# Patient Record
Sex: FEMALE | Race: WHITE | NOT HISPANIC OR LATINO
[De-identification: names, ages, dates, MRNs, and addresses within clinical notes are randomized per-mention and may not be internally consistent; named-entity substitution may affect disease eponyms.]

---

## 2017-01-05 ENCOUNTER — TRANSCRIPTION ENCOUNTER (OUTPATIENT)
Age: 42
End: 2017-01-05

## 2017-08-14 ENCOUNTER — RESULT REVIEW (OUTPATIENT)
Age: 42
End: 2017-08-14

## 2018-12-28 ENCOUNTER — RESULT REVIEW (OUTPATIENT)
Age: 43
End: 2018-12-28

## 2021-05-10 ENCOUNTER — RESULT REVIEW (OUTPATIENT)
Age: 46
End: 2021-05-10

## 2021-08-17 ENCOUNTER — TRANSCRIPTION ENCOUNTER (OUTPATIENT)
Age: 46
End: 2021-08-17

## 2024-06-06 ENCOUNTER — APPOINTMENT (OUTPATIENT)
Dept: UROGYNECOLOGY | Facility: CLINIC | Age: 49
End: 2024-06-06
Payer: COMMERCIAL

## 2024-06-06 VITALS
DIASTOLIC BLOOD PRESSURE: 76 MMHG | WEIGHT: 145 LBS | HEART RATE: 82 BPM | SYSTOLIC BLOOD PRESSURE: 116 MMHG | BODY MASS INDEX: 23.3 KG/M2 | HEIGHT: 66 IN

## 2024-06-06 DIAGNOSIS — N81.89 OTHER FEMALE GENITAL PROLAPSE: ICD-10-CM

## 2024-06-06 DIAGNOSIS — R33.9 RETENTION OF URINE, UNSPECIFIED: ICD-10-CM

## 2024-06-06 DIAGNOSIS — N81.6 RECTOCELE: ICD-10-CM

## 2024-06-06 DIAGNOSIS — N81.11 CYSTOCELE, MIDLINE: ICD-10-CM

## 2024-06-06 LAB
BILIRUB UR QL STRIP: NORMAL
CLARITY UR: CLEAR
COLLECTION METHOD: NORMAL
GLUCOSE UR-MCNC: NORMAL
HCG UR QL: 0.2 EU/DL
HGB UR QL STRIP.AUTO: NORMAL
KETONES UR-MCNC: NORMAL
LEUKOCYTE ESTERASE UR QL STRIP: NORMAL
NITRITE UR QL STRIP: NORMAL
PH UR STRIP: 6
PROT UR STRIP-MCNC: NORMAL
SP GR UR STRIP: 1.01

## 2024-06-06 PROCEDURE — 99204 OFFICE O/P NEW MOD 45 MIN: CPT | Mod: 25

## 2024-06-06 PROCEDURE — 51701 INSERT BLADDER CATHETER: CPT

## 2024-06-06 PROCEDURE — 81003 URINALYSIS AUTO W/O SCOPE: CPT | Mod: QW

## 2024-06-06 PROCEDURE — 99459 PELVIC EXAMINATION: CPT

## 2024-06-06 NOTE — HISTORY OF PRESENT ILLNESS
[FreeTextEntry1] : Stacia Thurston is a 49-year-old P2 presenting for evaluation of vaginal bulge. Feels something when she touches/wipes in the area. Denies any sensation at other times. No pain or discomfort. Reports symptoms started 2 months ago when she had an episode of severe constipation and was straining. She denies any chronic/baseline constipation issues. She denies leakage with activity or exertion. Reports urinary urgency but no leakage with urge.  She is sexually active and is bothered by awareness of the bulge but denies any pain with intercourse.   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

## 2024-06-06 NOTE — PROCEDURE
[FreeTextEntry1] : A sterile straight catheterization was performed to rule out a urinary tract infection and measure postvoid residual volume, which was 275 ml.

## 2024-06-06 NOTE — DISCUSSION/SUMMARY
[FreeTextEntry1] : #Prolapse:  - I counseled Stacia on the risk factors and etiologies of pelvic organ prolapse. - We reviewed management options, which included continued observation, Kegels and/or pelvic floor physical therapy, pessary, and surgery.  - She would like to start with pelvic floor therapy. Referral placed to [facility] and patient also provided with a list of external locations if needed based on availability.   #Incomplete bladder emptying -  ml today; advised patient of need to repeat to confirm retention.  - Urine dipstick negative, no concern for infection. - If persistently elevated would recommend urodynamic testing.  - RTO in 1-2 weeks for repeat PVR.

## 2024-06-06 NOTE — PHYSICAL EXAM
[Chaperone Present] : A chaperone was present in the examining room during all aspects of the physical examination [[id number]] : A chaperone was present during the pelvic exam. [No Lesions] : no lesions were seen on the external genitalia [Labia Majora] : were normal [Labia Minora] : were normal [Normal Appearance] : general appearance was normal [1] : 1 [Aa ____] : Aa [unfilled] [Ba ____] : Ba [unfilled] [C ____] : C [unfilled] [GH ____] : GH [unfilled] [PB ____] : PB [unfilled] [TVL ____] : TVL  [unfilled] [Ap ____] : Ap [unfilled] [Bp ____] : Bp [unfilled] [D ____] : D [unfilled] [Normal] : normal [Exam Deferred] : was deferred [FreeTextEntry2] : Estefany [FreeTextEntry1] : General: Well, appearing, no acute distress HEENT: Normocephalic, atraumatic Respiratory: Speaking in full sentences comfortably, normal work of breathing and no cough during visit Extremities: No upper extremity edema noted Skin: No obvious rash or skin lesions Neuro: Alert and oriented x 3, speech is fluent, normal rate Psych: Normal mood and affect

## 2024-06-12 DIAGNOSIS — Z78.9 OTHER SPECIFIED HEALTH STATUS: ICD-10-CM

## 2024-06-12 DIAGNOSIS — Z82.3 FAMILY HISTORY OF STROKE: ICD-10-CM

## 2024-06-12 DIAGNOSIS — Z81.8 FAMILY HISTORY OF OTHER MENTAL AND BEHAVIORAL DISORDERS: ICD-10-CM

## 2024-06-12 DIAGNOSIS — Z82.49 FAMILY HISTORY OF ISCHEMIC HEART DISEASE AND OTHER DISEASES OF THE CIRCULATORY SYSTEM: ICD-10-CM

## 2024-06-12 DIAGNOSIS — Z87.19 PERSONAL HISTORY OF OTHER DISEASES OF THE DIGESTIVE SYSTEM: ICD-10-CM

## 2024-06-12 DIAGNOSIS — Z83.3 FAMILY HISTORY OF DIABETES MELLITUS: ICD-10-CM

## 2024-06-12 DIAGNOSIS — Z85.828 PERSONAL HISTORY OF OTHER MALIGNANT NEOPLASM OF SKIN: ICD-10-CM

## 2024-07-11 ENCOUNTER — APPOINTMENT (OUTPATIENT)
Dept: UROGYNECOLOGY | Facility: CLINIC | Age: 49
End: 2024-07-11
Payer: COMMERCIAL

## 2024-07-11 VITALS
HEART RATE: 82 BPM | SYSTOLIC BLOOD PRESSURE: 118 MMHG | WEIGHT: 148 LBS | HEIGHT: 66 IN | DIASTOLIC BLOOD PRESSURE: 76 MMHG | BODY MASS INDEX: 23.78 KG/M2

## 2024-07-11 DIAGNOSIS — N81.11 CYSTOCELE, MIDLINE: ICD-10-CM

## 2024-07-11 DIAGNOSIS — N81.6 RECTOCELE: ICD-10-CM

## 2024-07-11 PROCEDURE — 51798 US URINE CAPACITY MEASURE: CPT

## 2024-07-11 PROCEDURE — 99459 PELVIC EXAMINATION: CPT

## 2024-07-11 PROCEDURE — 99212 OFFICE O/P EST SF 10 MIN: CPT

## 2024-09-11 ENCOUNTER — NON-APPOINTMENT (OUTPATIENT)
Age: 49
End: 2024-09-11